# Patient Record
Sex: FEMALE | Race: WHITE | NOT HISPANIC OR LATINO | URBAN - METROPOLITAN AREA
[De-identification: names, ages, dates, MRNs, and addresses within clinical notes are randomized per-mention and may not be internally consistent; named-entity substitution may affect disease eponyms.]

---
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
## 2018-02-21 ENCOUNTER — RECORDS - HEALTHEAST (OUTPATIENT)
Dept: GENERAL RADIOLOGY | Facility: CLINIC | Age: 18
End: 2018-02-21

## 2018-02-21 ENCOUNTER — OFFICE VISIT - HEALTHEAST (OUTPATIENT)
Dept: PEDIATRICS | Facility: CLINIC | Age: 18
End: 2018-02-21

## 2018-02-21 DIAGNOSIS — M25.552 HIP PAIN, BILATERAL: ICD-10-CM

## 2018-02-21 DIAGNOSIS — Z11.3 SCREEN FOR STD (SEXUALLY TRANSMITTED DISEASE): ICD-10-CM

## 2018-02-21 DIAGNOSIS — M25.551 PAIN IN RIGHT HIP: ICD-10-CM

## 2018-02-21 DIAGNOSIS — M25.552 PAIN IN LEFT HIP: ICD-10-CM

## 2018-02-21 DIAGNOSIS — M25.551 HIP PAIN, BILATERAL: ICD-10-CM

## 2018-02-21 LAB
CLUE CELLS: NORMAL
HCG UR QL: NEGATIVE
HIV 1+2 AB+HIV1 P24 AG SERPL QL IA: NEGATIVE
SP GR UR STRIP: 1.01 (ref 1–1.03)
TRICHOMONAS, WET PREP: NORMAL
YEAST, WET PREP: NORMAL

## 2018-02-22 LAB
C TRACH DNA SPEC QL PROBE+SIG AMP: NEGATIVE
N GONORRHOEA DNA SPEC QL NAA+PROBE: NEGATIVE
SYPHILIS RPR SCREEN - HISTORICAL: NORMAL

## 2018-08-13 ENCOUNTER — COMMUNICATION - HEALTHEAST (OUTPATIENT)
Dept: PEDIATRICS | Facility: CLINIC | Age: 18
End: 2018-08-13

## 2018-08-16 ENCOUNTER — COMMUNICATION - HEALTHEAST (OUTPATIENT)
Dept: TELEHEALTH | Facility: CLINIC | Age: 18
End: 2018-08-16

## 2018-08-16 ENCOUNTER — OFFICE VISIT - HEALTHEAST (OUTPATIENT)
Dept: PEDIATRICS | Facility: CLINIC | Age: 18
End: 2018-08-16

## 2018-08-16 DIAGNOSIS — N94.6 DYSMENORRHEA: ICD-10-CM

## 2018-08-16 DIAGNOSIS — F43.23 ADJUSTMENT DISORDER WITH MIXED ANXIETY AND DEPRESSED MOOD: ICD-10-CM

## 2018-08-16 DIAGNOSIS — N80.9 ENDOMETRIOSIS: ICD-10-CM

## 2018-08-16 DIAGNOSIS — F17.200 SMOKING: ICD-10-CM

## 2018-08-16 DIAGNOSIS — F12.10 MARIJUANA ABUSE: ICD-10-CM

## 2018-08-16 DIAGNOSIS — Z01.818 PRE-OP EXAM: ICD-10-CM

## 2018-08-16 DIAGNOSIS — G47.9 DISORDERED SLEEP: ICD-10-CM

## 2018-08-16 LAB — HGB BLD-MCNC: 14.5 G/DL (ref 12–16)

## 2018-08-16 ASSESSMENT — MIFFLIN-ST. JEOR: SCORE: 1515.89

## 2018-08-17 ENCOUNTER — COMMUNICATION - HEALTHEAST (OUTPATIENT)
Dept: PEDIATRICS | Facility: CLINIC | Age: 18
End: 2018-08-17

## 2020-04-28 ENCOUNTER — RECORDS - HEALTHEAST (OUTPATIENT)
Dept: ADMINISTRATIVE | Facility: OTHER | Age: 20
End: 2020-04-28

## 2021-03-07 ENCOUNTER — RECORDS - HEALTHEAST (OUTPATIENT)
Dept: ADMINISTRATIVE | Facility: OTHER | Age: 21
End: 2021-03-07

## 2021-05-30 VITALS — HEIGHT: 66 IN | BODY MASS INDEX: 28.16 KG/M2 | WEIGHT: 175.2 LBS

## 2021-05-30 VITALS — BODY MASS INDEX: 28.28 KG/M2 | WEIGHT: 175.2 LBS

## 2021-05-30 VITALS — WEIGHT: 175.5 LBS | HEIGHT: 66 IN | BODY MASS INDEX: 28.21 KG/M2

## 2021-05-30 VITALS — BODY MASS INDEX: 27.64 KG/M2 | WEIGHT: 172 LBS | HEIGHT: 66 IN

## 2021-05-31 VITALS — BODY MASS INDEX: 28.73 KG/M2 | WEIGHT: 178 LBS

## 2021-05-31 VITALS — HEIGHT: 66 IN | WEIGHT: 174.3 LBS | BODY MASS INDEX: 28.01 KG/M2

## 2021-06-01 VITALS — WEIGHT: 161.2 LBS | BODY MASS INDEX: 25.3 KG/M2 | HEIGHT: 67 IN

## 2021-06-01 VITALS — BODY MASS INDEX: 28.23 KG/M2 | WEIGHT: 174.9 LBS
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
## 2021-06-09 NOTE — PROGRESS NOTES
Assessment/Plan:  1. Attention deficit disorder    - dextroamphetamine-amphetamine (ADDERALL XR) 30 MG 24 hr capsule; Take 1 capsule (30 mg total) by mouth every morning.  Dispense: 30 capsule; Refill: 0  - dextroamphetamine-amphetamine (ADDERALL XR) 30 MG 24 hr capsule; Take 1 capsule (30 mg total) by mouth every morning.  Dispense: 30 capsule; Refill: 0    2. Adjustment disorder with mixed anxiety and depressed mood    - venlafaxine (EFFEXOR XR) 75 MG 24 hr capsule; Take 1 capsule (75 mg total) by mouth daily.  Dispense: 30 capsule; Refill: 0    3. Adjustment disorder with mixed disturbance of emotions and conduct    - venlafaxine (EFFEXOR XR) 75 MG 24 hr capsule; Take 1 capsule (75 mg total) by mouth daily.  Dispense: 30 capsule; Refill: 0    4. Smoking  Quitting options offered and declined.     5. Disordered sleep      Patient Instructions   Sleep:    Set an earlier bed time. No napping. Use trazodone 100mg as needed. Add in in 5-10 mg of melatonin as needed.     I can set up referral to psychiatry any time.         ADHD:        Medication: Increase Adderall XR to 30mg once per day in the morning        Possible Side effects: Decreased appetite, difficulties sleeping, hyperactivity as the medicine wears off, headaches, unmasking a tic, depression or rarely psychosis.   Please call if you are experiencing any of these side effects.      Follow-Up: Follow up in 2 months (May 2017) or sooner with any concerns.      Lost prescriptions cannot be replaced.       Anxiety and Depression.     Medication: Effexor XR 75mg.      You have 37.5mg pills at home.  Take two of them until you don't have any more.  Then, you can  the 75mg pills.  Please take them consistently so that they can work.      Onset: Onset is usually within 2 week, but it may take up to 8 weeks to see a full effect.      Possible Side effects:The most common side effects can include sleepiness, nausea, headache, sweating, diarrhea, and an  increase in depression. These side effects can lessen with a longer time on the medicine.      This medication has a BLACK BOX WARNING because in some individuals it increases the risk of suicide. Keep alert to a possible increasing in depression. Check in with your child daily for the first 2-4 weeks to make sure things are going well.     Follow-Up:  Follow up in the clinic in 7-8 weeks, sooner if needed. Feel free to call in earlier if needed with any questions or concerns.          If for some reason you need to come off the medicine, you should not stop it cold turkey. To come off you should give us a call and then cut the dose in half for a week before stopping      We can set her up with a psychiatrist any time.   I will not prescribe remeron.   I still suggest a therapist.   I will consider a random drug screen at the next visit.  She is showing signs of increasing risky behaviors.     SUBJECTIVE:       Daily Emerson is a 16 y.o. female here to follow up on ADHD and depression and anxiety.      She was seen on 1/11/17 when we came off her zoloft and changed to Effexor XR gradual increase to 75mg per day.  She was increased on her Adderall XR 30mg. Pt is currently also on and Trazodone 100mg for sleep. At her visit on 5/12/16 she was changed from Concerta 36mg, and Prozac 40mg due to poor efficacy.      She has not been any improved.  Juvenile consistently take her Effexor.  She has not been on it barely at all over the last 2 months.  In the last week her mom notes that she was taking her medicine and made her start taking it.  She is very good at taking her Adderall but is very poor taking her Effexor.  She's not feeling very grade.  She is actually a little better than she was a few months ago but overall still not very happy.  She is feeling very anxious.  She started new job at [employer].  Her boss is been yelling at her great deal of this makes her anxious and upset.  Currently attention is okay.   She is less restless and fidgety.  Her focus is improved.  She thinks most of this is due to the increase in dose of Adderall.  She's had no side effects from the increase in Adderall.  She had no side effects from the Effexor either.   She is still struggling with sleep at night.  She is taking the trazodone 100 mg of feels like it doesn't work.  Her mother and brother on Remeron and she was wondering about that medication.  We tried to set her up for therapy at her last visit.  She never made this appointment and feels like she is not interested at this time.  She says that she is not enough time for it.  She has no suicide ideation.  No cutting or self harm.  She is having nightmares at night.  She had a panic attack yesterday.  She usually has one every couple weeks.  She has started smoking since her last visit.  She has 2 or 3 cigarettes per day.  She doesn't want her mom to know that she smoking.  Because her friends are smokers.  She still admits to marijuana abuse.  She says that she uses it 2 times per month.  She usually uses it by herself to calm her anxiety.     . She commonly goes to bed between 10 and midnight. She is napping after school. She naps for about 1-2 hours after school.  Her friendships have been better lately.  She feels like her friends are very dramatic and crazy. Her grades have been better. She's got A's and B's mostly. She has one C minus. She takes her adderall at around 7:30 in the morning. She feels like it lasts to about 3pm. She is sometimes a very quite person that bottles per emotion.     She denies any abuse of her Adderall. She does admit that her friends have asked her to abuse the Adderall and she has refused. She hides her medications so that they're not able to find it.She is in family therapy with her brother, but not for herself.Her mother is on sertraline as well as her grandmother and has done very well with this medication. Her mother has had a bad reaction to  Effexor. Her diagnosis was made in 6th grade.  She has tried Ritalin LA and she is not sure why that medicine was stopped.      Suicide Ideation/Cutting: no cutting. She can feel hopeless at times. No suicide plans.   Panic attacks, Gretta, post-traumatic stress: about one a week.   Current school and grade level: 12th grade fall 2016.  She wants to become a .    Special classes if any: None  Peer interactions: On and off.  She will pull back and hang with her mother instead of a friends  Drug abuse: Marijuana use recreationally. She uses it to unwind.     Social history:  Lives at home with parents and siblings  Family stressors: none    Family history:   ADHD: brother  Learning problems: none  Anxiety, Bipolar, depression: mother and brother.  Mom is on lexapro and buspar, and brother is on celexa.    Tic's or tourette's: none  Hypertrophic cardiomyopathy, long Qtc and sudden death: None       Data:     [place] score: Inattention #1-9: 3, hyperactivity #10-18: 3, ODD #19-26: 1, Conduct Disorder # 27-40: 0, Anxiety and Mood # 41-47: 0, Performance #48-55: 1             PHQ-9       Little interest or pleasure in doing things: Several days  Feeling down, depressed, or hopeless: Several days  Trouble falling or staying asleep, or sleeping too much: More than half the days  Feeling tired or having little energy: Several days  Poor appetite or overeating: Several days  Feeling bad about yourself - or that you are a failure or have let yourself or your family down: Not at all  Trouble concentrating on things, such as reading the newspaper or watching television: Several days  Moving or speaking so slowly that other people could have noticed. Or the opposite - being so fidgety or restless that you have been moving around a lot more than usual: Several days  Thoughts that you would be better off dead, or of hurting yourself in some way: Not at all  PHQ-9 Total Score: 8  If you checked off any problems, how difficult  have these problems made it for you to do your work, take care of things at home, or get along with other people?: Somewhat difficult    GUILLERMINA-7 Screening Results:  Feeling nervous, anxious or on edge: 2  Not being able to stop or control worry: 1  Worrying too much about different things: 1  Trouble relaxin  Being so restless that is is hard to sit still: 1  Becoming easily annnoyed or irritable: 2  Feeling afraid as if something awful might happen: 1  GUILLERMINA-7 Total: 10  How difficult did these problems make it for you to do your work, take care of things at home or get along with other people? : Somewhat difficult    Social History     Social History     Marital status: Single     Spouse name: N/A     Number of children: N/A     Years of education: N/A     Occupational History     Not on file.     Social History Main Topics     Smoking status: Current Every Day Smoker     Smokeless tobacco: Not on file     Alcohol use No     Drug use: No     Sexual activity: No     Other Topics Concern     Not on file     Social History Narrative        Past Medical History:  No past medical history on file.  No past surgical history on file.    Current Meds:   Current Outpatient Prescriptions on File Prior to Visit   Medication Sig Dispense Refill     cetirizine (ZYRTEC) 10 MG tablet Take 10 mg by mouth.       etonogestrel (NEXPLANON) 68 mg Impl implant Inject 1 each under the skin.       venlafaxine (EFFEXOR-XR) 37.5 MG 24 hr capsule Use 37.5 mg daily for 1 week. Increase to 2 pills per day after that. 40 capsule 1     [DISCONTINUED] dextroamphetamine-amphetamine (ADDERALL XR) 30 MG 24 hr capsule Take 1 capsule (30 mg total) by mouth every morning. 30 capsule 0     [DISCONTINUED] dextroamphetamine-amphetamine (ADDERALL XR) 30 MG 24 hr capsule Take 1 capsule (30 mg total) by mouth every morning. 30 capsule 0     No current facility-administered medications on file prior to visit.      Allergies: No Known  "Allergies    ROS:  General: Health is good  Endocrine: Growing in height and weight well.  Cardiac: No chest pain, palpitations, or syncope, No chest pain with exercise   GI: Good appetite, no stomachaches, no nausea, no diarrhea, no emesis, no constipation  : no enuresis  Neuro: No headaches, sleep disturbance, tics, changes in mood, no changes in activity level.     Exam:  Vitals:    03/15/17 1653   BP: 120/74   Patient Site: Right Arm   Patient Position: Sitting   Cuff Size: Adult Regular   Pulse: 92   Weight: 175 lb 8 oz (79.6 kg)   Height: 5' 6\" (1.676 m)       MENTAL STATUS:  Gen: Alert, oriented. Well groomed, interacts appropriately with examiner.    Speech:No abnormal speech or flight of ideas.   Mood: euthymic.    Thought content: No abnormal thought content.  Judgment: intact  Fund of knowledge: appropriate for age.  Neck: thyroid non enlarged  Cardiovascular Exam: RRR without murmurs, clicks or gallops.  Lung Exam: Clear and equal breath sounds.  Musculoskeletal Exam: Gross survey unremarkable. Gait smooth and coordinated.       Total of 30 minutes spent with patient, > 50% spent in counseling and/or coordination of care.     Shaylee Weinberg ....................  3/15/2017   4:59 PM      "

## 2021-06-09 NOTE — PROGRESS NOTES
Assessment/Plan:  1. Attention deficit disorder    - Ambulatory referral to Psychiatry    2. Adjustment disorder with mixed anxiety and depressed mood    - Ambulatory referral to Psychiatry  - venlafaxine (EFFEXOR XR) 150 MG 24 hr capsule; Take 1 capsule (150 mg total) by mouth daily.  Dispense: 60 capsule; Refill: 0    3. Adjustment disorder with mixed disturbance of emotions and conduct    - Ambulatory referral to Psychiatry  - venlafaxine (EFFEXOR XR) 150 MG 24 hr capsule; Take 1 capsule (150 mg total) by mouth daily.  Dispense: 60 capsule; Refill: 0    4. Disordered sleep    - Ambulatory referral to Psychiatry  - traZODone (DESYREL) 50 MG tablet; Take 2 tablets (100 mg total) by mouth at bedtime.  Dispense: 60 tablet; Refill: 1    5. Marijuana abuse    - Ambulatory referral to Psychiatry    Help quitting smoking was offered and declined.   She can see an adult psychiatrist now that she is 16 years of age.    They will take over he medication management.    I can fill prescriptions to get her through until she is seen, but will no longer be prescribing for her mental health needs.   She has an Adderall XR 30mg rx previously sent that should cover her until 5/15/17.      Patient Instructions   I will refer you to psychiatry today due to the inability to get your anxiety, depression and sleep under control.     Our referral desk should contact you within 3-4 days to help set up this appointment. If you would like, you can make the appointment on your own before that time or before you leave today.     Please call and contact your insurance and ask them about coverage for the following providers.    Continue with therapy.       Sleep:     Set an earlier bed time. No napping. Use trazodone 100mg as needed. Add in in 5-10 mg of melatonin as needed.              ADHD:          Medication: Continue Adderall XR to 30mg once per day in the morning          Possible Side effects: Decreased appetite, difficulties sleeping,  hyperactivity as the medicine wears off, headaches, unmasking a tic, depression or rarely psychosis.   Please call if you are experiencing any of these side effects.         Lost prescriptions cannot be replaced.         Anxiety and Depression.      Medication: Increase Effexor  mg.      Possible Side effects:The most common side effects can include sleepiness, nausea, headache, sweating, diarrhea, and an increase in depression. These side effects can lessen with a longer time on the medicine.       This medication has a BLACK BOX WARNING because in some individuals it increases the risk of suicide. Keep alert to a possible increasing in depression. Check in with your child daily for the first 2-4 weeks to make sure things are going well.        If for some reason you need to come off the medicine, you should not stop it cold turkey. To come off you should give us a call and then cut the dose in half for a week before stopping      SUBJECTIVE:  Daily Emerson is a 16 y.o. female here with her mother to follow up on ADHD and depression and anxiety.       She was last seen on 3/15/17.  There was no changes to her medication made.  I asked her to consistently take her Effexor and follow up in 2 months.  She is also on Adderall XR 30mg for ADD and Trazodone 100mg for sleep.  On 1/11/17 when we came off her zoloft and changed to Effexor XR 75mg and she was increased on her Adderall XR 30mg. At her visit on 5/12/16 she was changed from Concerta 36mg, and Prozac 40mg due to poor efficacy.      Daily has been doing well.  She is seem more more depressed over the last month.  She does not seem any particular improvement with the Effexor so far.  She says that some days she feels good but in general she overall she is feeling worse.  She says she is much more sad and depressed.  Before this time she was never crying individual and now she cries nearly daily.  She seems to get extremely angry very quickly.  She is  irritated by her family commonly.  She does not want to hang out with them anymore.  She only wants to hang out with her friends.  They seem to be sort of a bad influence overall.  She is now smoking when she was in before.  She is smoking marijuana.  She has been missing a ton of school.  She would not go to school without her mom being aware.  She had so many absences that she had to come in and speak with the school counselor.  She is seen the therapist at school now once per week for the last 3 weeks.  They have set her up with a suggestion for a therapist more long-term.  She says that she would rather sleep all day then go to school.  She has mostly A's and B's with one D.  She has no homework on a regular basis so that has not been an issue.  She does not say that she feels like killing herself.  She had some problems with a boy over the last month which is part of her worsening depression.  There are no longer together mom thinks that this is a good thing.  She stopped working at [employer].  It was never really positive place for her.  She is not isolating herself.  No change in appetite.  Her sleep is all over the place per mom.  Daily thinks it is okay.  She naps nearly daily.  She stays up very late at night.  She has to go out with friends at 1 AM on a school night.  She uses trazodone as needed 100 mg.  Uses this most of the time.  He denies any self-harm.  She says she has been taking the Effexor consistently for the last month.  He does like her attention is good and she is not worried about her ADD.  Mom is wondering about Wellbutrin.  Mom is on Wellbutrin plus her antidepression medicine and she said that that helped her quit smoking significantly.  At this time Daily has no desire to quit smoking.She still admits to marijuana abuse. She says that she uses it 2 times per month. She usually uses it by herself to calm her anxiety.        She denies any abuse of her Adderall. She does admit  that her friends have asked her to abuse the Adderall and she has refused. She hides her medications so that they're not able to find it.  She is in family therapy with her brother, but not for herself. Her mother is on sertraline as well as her grandmother and has done very well with this medication. Her mother has had a bad reaction to Effexor. Her diagnosis was made in 6th grade.  She has tried Ritalin LA and she is not sure why that medicine was stopped.      Suicide Ideation/Cutting: no cutting. She can feel hopeless at times. No suicide plans.   Panic attacks, Gretta, post-traumatic stress: about one a week.   Current school and grade level: 12th grade fall 2016.  She wants to become a .    Special classes if any: None  Peer interactions: On and off.  She will pull back and hang with her mother instead of a friends  Drug abuse: Marijuana use recreationally. She uses it to unwind.   Social history:  Lives at home with parents and siblings  Family stressors: boy problems.    Family history:   ADHD: brother  Learning problems: none  Anxiety, Bipolar, depression: mother and brother.  Mom is on lexapro and buspar, and brother is on celexa.    Tic's or tourette's: none  Hypertrophic cardiomyopathy, long Qtc and sudden death: None           Data:    Kathy score parents: Inattention #1-9: 4, hyperactivity #10-18: 0, Performance #19-26: 4    Comparison to last visit:   Prior Kathy score: Inattention #1-9: 3, hyperactivity #10-18: 3, ODD #19-26: 1, Conduct Disorder # 27-40: 0, Anxiety and Mood # 41-47: 0, Performance #48-55: 1           PHQ-9     Little interest or pleasure in doing things: Several days  Feeling down, depressed, or hopeless: Several days  Trouble falling or staying asleep, or sleeping too much: More than half the days  Feeling tired or having little energy: Several days  Poor appetite or overeating: Several days  Feeling bad about yourself - or that you are a failure or have let yourself or  your family down: Not at all  Trouble concentrating on things, such as reading the newspaper or watching television: Several days  Moving or speaking so slowly that other people could have noticed. Or the opposite - being so fidgety or restless that you have been moving around a lot more than usual: Several days  Thoughts that you would be better off dead, or of hurting yourself in some way: Not at all  PHQ-9 Total Score: 8  If you checked off any problems, how difficult have these problems made it for you to do your work, take care of things at home, or get along with other people?: Somewhat difficult     GUILLERMINA-7 Screening Results:  Feeling nervous, anxious or on edge: 2  Not being able to stop or control worry: 1  Worrying too much about different things: 1  Trouble relaxin  Being so restless that is is hard to sit still: 1  Becoming easily annnoyed or irritable: 2  Feeling afraid as if something awful might happen: 1  GUILLERMINA-7 Total: 10  How difficult did these problems make it for you to do your work, take care of things at home or get along with other people? : Somewhat difficult        Social History     Social History     Marital status: Single     Spouse name: N/A     Number of children: N/A     Years of education: N/A     Occupational History     Not on file.     Social History Main Topics     Smoking status: Current Every Day Smoker     Smokeless tobacco: Not on file     Alcohol use No     Drug use: No     Sexual activity: No     Other Topics Concern     Not on file     Social History Narrative       Past Medical History:  No past medical history on file.  No past surgical history on file.    Current Meds:   Current Outpatient Prescriptions on File Prior to Visit   Medication Sig Dispense Refill     cetirizine (ZYRTEC) 10 MG tablet Take 10 mg by mouth.       dextroamphetamine-amphetamine (ADDERALL XR) 30 MG 24 hr capsule Take 1 capsule (30 mg total) by mouth every morning. 30 capsule 0     [START ON  "4/15/2017] dextroamphetamine-amphetamine (ADDERALL XR) 30 MG 24 hr capsule Take 1 capsule (30 mg total) by mouth every morning. 30 capsule 0     etonogestrel (NEXPLANON) 68 mg Impl implant Inject 1 each under the skin.       [DISCONTINUED] venlafaxine (EFFEXOR XR) 75 MG 24 hr capsule Take 1 capsule (75 mg total) by mouth daily. 30 capsule 0     [DISCONTINUED] venlafaxine (EFFEXOR-XR) 37.5 MG 24 hr capsule Use 37.5 mg daily for 1 week. Increase to 2 pills per day after that. 40 capsule 1     No current facility-administered medications on file prior to visit.      Allergies: No Known Allergies    ROS:  General: Health is good  Endocrine: Growing in height and weight well.  Cardiac: No chest pain, palpitations, or syncope, No chest pain with exercise   GI: Good appetite, no stomachaches, no nausea, no diarrhea, no emesis, no constipation  : no enuresis  Neuro: No headaches, sleep disturbance, tics, changes in mood, no changes in activity level.     Exam:  Vitals:    04/05/17 1346   BP: 118/74   Patient Site: Right Arm   Patient Position: Sitting   Cuff Size: Adult Large   Pulse: 100   Resp: 16   Weight: 175 lb 3.2 oz (79.5 kg)   Height: 5' 6\" (1.676 m)       MENTAL STATUS:  Gen: Alert, oriented. Well groomed, interacts appropriately with examiner.    Speech:No abnormal speech or flight of ideas.   Mood: euthymic.    Thought content: No abnormal thought content.  Judgment: intact  Fund of knowledge: appropriate for age.  Neck: thyroid non enlarged  Cardiovascular Exam: RRR without murmurs, clicks or gallops.  Lung Exam: Clear and equal breath sounds.  Musculoskeletal Exam: Gross survey unremarkable. Gait smooth and coordinated.         Total of 30 minutes spent with patient, > 50% spent in counseling and/or coordination of care.     Shaylee Weinberg ....................  4/5/2017   1:47 PM    "

## 2021-06-10 NOTE — PROGRESS NOTES
ASSESSMENT:   1. Throat pain  Rapid Strep A Screen-Throat    Group A Strep, RNA Direct Detection, Throat   2. URI (upper respiratory infection)         PLAN:  Upper respiratory infection  Likely viral at this time.   Push fluids, get extra rest  Recommend hot tea with lemon/honey, lozenges, chloraseptic spray or salt water gargles to soothe your throat  Recommend hot steamy showers, saline nasal spray or a netti pot to relieve congestion  May use a cough suppressant or a cool mist humidifier to lessen cough  Return to clinic if symptoms are not improving as expected or if worsening in any way.       SUBJECTIVE:   aDily Emerson is a 16 y.o. female presents today with cold symptoms for 2 days. She has had sore throat, rhinorrhea, nasal congestion, PND, mostly dry cough but denies swollen glands, myalgias, headache, fever, chills and SOB, dyspnea. Sick contacts: family with URI. Has tried cough syrup and ibuprofen with some relief. She smokes tobacco once daily. She denies any hx of asthma.     No past medical history on file.    History   Smoking Status     Current Every Day Smoker   Smokeless Tobacco     Not on file       Current Medications:  Current Outpatient Prescriptions   Medication Sig Dispense Refill     dextroamphetamine-amphetamine (ADDERALL XR) 30 MG 24 hr capsule Take 1 capsule (30 mg total) by mouth every morning. 30 capsule 0     etonogestrel (NEXPLANON) 68 mg Impl implant Inject 1 each under the skin.       traZODone (DESYREL) 50 MG tablet Take 2 tablets (100 mg total) by mouth at bedtime. 60 tablet 1     venlafaxine (EFFEXOR XR) 150 MG 24 hr capsule Take 1 capsule (150 mg total) by mouth daily. 60 capsule 0     cetirizine (ZYRTEC) 10 MG tablet Take 10 mg by mouth.       No current facility-administered medications for this visit.        Allergies:   No Known Allergies    OBJECTIVE:   Vitals:    05/02/17 1830   BP: 96/62   Pulse: 99   Resp: 16   Temp: 99.4  F (37.4  C)   TempSrc: Temporal   SpO2: 99%    Weight: 175 lb 3.2 oz (79.5 kg)     Physical exam reveals a pleasant 16 y.o. female.   Appears alert and cooperative.  Eyes:  ELIS, EOMI  Ears:  normal TMs bilaterally and normal canals bilaterally  Nose:    Mucosa normal. Scant, clear rhinorrhea.septum midline, normal mucosa and clear rhinorrhea  Mouth:  Mucosa pink and moist.  mild erythema   Neck: few small anterior cervical nodes  Sinuses: nontender with palpation  Lungs: Chest is clear, no wheezing or rales. Symmetric air entry throughout both lung fields. Occasional congested cough  Heart: regular rate and rhythm, no murmur, rub or gallop

## 2021-06-11 NOTE — PROGRESS NOTES
Assessment/Plan:  1. Attention deficit disorder    - dextroamphetamine-amphetamine (ADDERALL XR) 30 MG 24 hr capsule; Take 1 capsule (30 mg total) by mouth every morning.  Dispense: 30 capsule; Refill: 0  - dextroamphetamine-amphetamine (ADDERALL XR) 30 MG 24 hr capsule; Take 1 capsule (30 mg total) by mouth every morning.  Dispense: 30 capsule; Refill: 0  - dextroamphetamine-amphetamine (ADDERALL XR) 30 MG 24 hr capsule; Take 1 capsule (30 mg total) by mouth every morning.  Dispense: 30 capsule; Refill: 0    2. Adjustment disorder with mixed anxiety and depressed mood    - venlafaxine (EFFEXOR XR) 150 MG 24 hr capsule; Take 1 capsule (150 mg total) by mouth daily.  Dispense: 60 capsule; Refill: 2    3. Adjustment disorder with mixed disturbance of emotions and conduct    - venlafaxine (EFFEXOR XR) 150 MG 24 hr capsule; Take 1 capsule (150 mg total) by mouth daily.  Dispense: 60 capsule; Refill: 2    Patient Instructions   I am happy to continue to prescribe her medications as long as remained stable.  We will again begin to struggle with anxiety and depression I would suggest that he follow-up with psychiatry like we spoke about at the last visit.    Medication: Effexor XR 150mg daily. Adderall XR 30mg daily. Trazodone 100mg at night as needed for sleep.  OK to wean off of this as tolerated.     This medication has a BLACK BOX WARNING because in some individuals it increases the risk of suicide.  Keep alert to a possible increasing in depression.  Check in with your child daily for the first 2-4 weeks to make sure things are going well.    Follow-Up: Follow up in the clinic in 6 months (December 2017), sooner if needed.     Call in 3 months (September) for 3 more Adderall prescriptions.      If for some reason you need to come off the medicine, you should not stop it cold turkey.  To come off you should give us a call and then cut the dose in half for a week before stopping.       Psychology is suggested and  declined.     Continue with efforts to try to stop smoking and marijuana use.  Daily is aware that she could have a drug test with me at any time.          SUBJECTIVE:  Daily Emerson is a 16 y.o. female for anxiety, depression, and ADHD inattentive type.         She was last seen on 4/5/17 when her Effexor XR was increased to 150mg, and she was referred to psychiatry for increasing anxiety and depression despite various treatment changes.      She is also on Adderall XR 30mg for ADD, and Trazodone 100mg for sleep.  On 1/11/17 when we came off her zoloft and changed to Effexor XR 75mg and she was increased on her Adderall XR 30mg. At her visit on 5/12/16 she was changed from Concerta 36mg, and Prozac 40mg due to poor efficacy.        Daily has been doing very well.  The increase in Effexor seemed to make a big difference and she is doing much better.  She is also graduated from high school and she is so happy about that.  She has been waiting to graduate since ninth grade.  She feels like the school setting is not a very good place for her.  She is excited to go out to the work field.  Currently she is looking for jobs.  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  She thinks she is can have to get 2 jobs in order to support her finances.  She does not plan to go to college at this point but may consider it in the future.  She has less anxiety and less depression right now.  She is not sad or tearful.  She only has a panic attack once every 6 months or so.  She has not had one in a long time.  She is fighting less with her family.  She is social with her friends.  She is smoking less and trying to quit.  She now smokes only about 2 days per week.  She still is smoking marijuana.  She has marijuana for about 5 days in a row once per month and then stops.  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  She denies abusing her Adderall.  She is very forthcoming  with what she does not does not do.  Looking at the controlled substance [place] prescribing program online she is only filled the prescriptions I have ever given her and has no other controlled substances.  She takes the medication every day.  Even though she is out of school she believes that she is going to need the medicine.  She really struggles with focus and concentration and organization outside of school as well.  She has been sleeping better.  She has not needed her trazodone.  She has been using it only about once per week as needed.  She is also not using melatonin.  She is glad about that progress.  She hopes that she can come off of it altogether.      Mom is on Wellbutrin plus her antidepression medicine and she said that that helped her quit smoking significantly.  Her mother is on sertraline as well as her grandmother and has done very well with this medication. Her mother has had a bad reaction to Effexor. Her diagnosis was made in 6th grade.  She has tried Ritalin LA and she is not sure why that medicine was stopped.      Suicide Ideation/Cutting: no cutting. No suicide plans.   Current school and grade level: 12th grade fall 2016.  She wants to become a .    Special classes if any: None  Peer interactions: On and off.  She will pull back and hang with her mother instead of a friends  Drug abuse: Marijuana use recreationally. She uses it to unwind.   Social history:  Lives at home with parents and siblings    Family history:   ADHD: brother  Learning problems: none  Anxiety, Bipolar, depression: mother and brother.  Mom is on lexapro and buspar, and brother is on celexa.    Tic's or tourette's: none  Hypertrophic cardiomyopathy, long Qtc and sudden death: None         Data:     Prior [place] score parents: Inattention #1-9: 4, hyperactivity #10-18: 0, Performance #19-26: 4        PHQ-9     PHQ-9 Total Score: 5  If you checked off any problems, how difficult have these problems made it for  you to do your work, take care of things at home, or get along with other people?: Somewhat difficult     GUILLERMINA-7 Screening Results:    GUILLERMINA-7 Total: 7  How difficult did these problems make it for you to do your work, take care of things at home or get along with other people? : Somewhat difficult          Social History     Social History     Marital status: Single     Spouse name: N/A     Number of children: N/A     Years of education: N/A     Occupational History     Not on file.     Social History Main Topics     Smoking status: Current Every Day Smoker     Smokeless tobacco: Not on file     Alcohol use No     Drug use: No     Sexual activity: No     Other Topics Concern     Not on file     Social History Narrative       Past Medical History:  No past medical history on file.  No past surgical history on file.    Current Meds:   Current Outpatient Prescriptions on File Prior to Visit   Medication Sig Dispense Refill     cetirizine (ZYRTEC) 10 MG tablet Take 10 mg by mouth.       etonogestrel (NEXPLANON) 68 mg Impl implant Inject 1 each under the skin.       traZODone (DESYREL) 50 MG tablet Take 2 tablets (100 mg total) by mouth at bedtime. 60 tablet 1     [DISCONTINUED] dextroamphetamine-amphetamine (ADDERALL XR) 30 MG 24 hr capsule Take 1 capsule (30 mg total) by mouth every morning. 30 capsule 0     [DISCONTINUED] venlafaxine (EFFEXOR XR) 150 MG 24 hr capsule Take 1 capsule (150 mg total) by mouth daily. 60 capsule 0     No current facility-administered medications on file prior to visit.      Allergies: No Known Allergies    ROS:  General: Health is good  Endocrine: Growing in height and weight well.  Cardiac: No chest pain, palpitations, or syncope, No chest pain with exercise   GI: Good appetite, no stomachaches, no nausea, no diarrhea, no emesis, no constipation  : no enuresis  Neuro: No headaches, sleep disturbance, tics, changes in mood, no changes in activity level.     Exam:  Vitals:    06/01/17 1351  "  BP: 110/68   Patient Site: Right Arm   Patient Position: Sitting   Cuff Size: Adult Regular   Pulse: 84   Weight: 174 lb 4.8 oz (79.1 kg)   Height: 5' 6\" (1.676 m)       MENTAL STATUS:  Gen: Alert, oriented. Well groomed, interacts appropriately with examiner.    Speech:No abnormal speech or flight of ideas.   Mood: euthymic.    Thought content: No abnormal thought content.  Judgment: intact  Fund of knowledge: appropriate for age.  Neck: thyroid non enlarged  Cardiovascular Exam: RRR without murmurs, clicks or gallops.  Lung Exam: Clear and equal breath sounds.  Musculoskeletal Exam: Gross survey unremarkable. Gait smooth and coordinated.       Total of 30 minutes spent with patient, > 50% spent in counseling and/or coordination of care.     Shaylee Weinberg ....................  6/1/2017   1:52 PM      "

## 2021-06-15 PROBLEM — F12.10 MARIJUANA ABUSE: Status: ACTIVE | Noted: 2017-04-05

## 2021-06-15 PROBLEM — G47.9 DISORDERED SLEEP: Status: ACTIVE | Noted: 2017-03-15

## 2021-06-15 PROBLEM — F17.200 SMOKING: Status: ACTIVE | Noted: 2017-04-05
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
## 2021-06-19 NOTE — PROGRESS NOTES
Preoperative Exam    Scheduled Procedure: Endometriosis Surgery 08/21/2018  Surgery Date:  08/21/2018  Surgery Location: [facility]  Surgeon:  Toney    Assessment/Plan:     1. Pre-op exam    - Hemoglobin    2. Endometriosis    - Hemoglobin    3. Dysmenorrhea      4. Marijuana abuse      5. Smoking      6. Disordered sleep      7. Adjustment disorder with mixed anxiety and depressed mood          Surgical Procedure Risk: Low (reported cardiac risk generally < 1%)  Have you had prior anesthesia?: Yes  Have you or any family members had a previous anesthesia reaction: Yes: itchy after  Do you or any family members have a history of a clotting or bleeding disorder?:  Yes: Endometriosis    Patient approved for surgery with general or local anesthesia.        Functional Status: Age Appropriate [place]  Patient plans to recover at home with family.  Do you have any concerns regarding care after surgery?: No     Subjective:      Daily Emerson is a 17 y.o. female who presents for a preoperative consultation.       She has a left 3cm ovarian cyst. Daily had a diagnostic laparoscopy in 2015. This showed some early endometriosis, which was treated. Patient had Nexplanon placed and this seemed to help for quite a while.    Patient now with pelvic pain, bloating, hip pain. Nexplanon had had some breakthrough bleeding. Patient would like to proceed with second diagnostic laparoscopy due to relief that she got with last surgery and the strong family history and now similar symptoms.     She has had some itching after anesthesia in the past.     She is on trazodone 1-2 times per month for disordered sleep.  She has a history of anxiety and ADHD that is not currently treated. She admits to frequent marijuana use and cigarette smoking.    All other systems reviewed and are negative, other than those listed in the HPI.    Pertinent History  Any croup, wheezing or respiratory illness in the past 3 weeks?:  Yes: just  finishing cold sx  History of obstructive sleep apnea: Yes, Trazadone to assist with sleep  Steroid use in the last 6 months: No  Any ibuprofen, NSAID or aspirin use in the last 2 weeks?: No made pt aware to avoid  Prior Blood Transfusion: No  Prior Blood Transfusion Reaction: No  If for some reason prior to, during or after the procedure, if it is medically indicated, would you be willing to have a blood transfusion?:  There is no transfusion refusal.  Any exposure in the past 3 weeks to chicken pox, Fifth disease, whooping cough, measles, tuberculosis?: No    Current Outpatient Prescriptions   Medication Sig Dispense Refill     cetirizine (ZYRTEC) 10 MG tablet Take 10 mg by mouth.       etonogestrel (NEXPLANON) 68 mg Impl implant Inject 1 each under the skin.       traZODone (DESYREL) 50 MG tablet Take 2 tablets (100 mg total) by mouth at bedtime. 60 tablet 1     venlafaxine (EFFEXOR XR) 150 MG 24 hr capsule Take 1 capsule (150 mg total) by mouth daily. 60 capsule 2     etonogestrel (NEXPLANON) 68 mg Impl implant Inject 1 each under the skin.       No current facility-administered medications for this visit.         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    No past surgical history on file.    Social History     Social History     Marital status: Single     Spouse name: N/A     Number of children: N/A     Years of education: N/A     Occupational History     Not on file.     Social History Main Topics     Smoking status: Current Every Day Smoker     Smokeless tobacco: Never Used     Alcohol use No     Drug use: No     Sexual activity: No     Other Topics Concern     Not on file     Social History Narrative  "            Objective:     Vitals:    08/16/18 1425   BP: 110/72   Pulse: 90   Temp: 98.8  F (37.1  C)   TempSrc: Oral   SpO2: 97%   Weight: 161 lb 3.2 oz (73.1 kg)   Height: 5' 6.5\" (1.689 m)   LMP: 08/09/2018         Physical Exam:  General appearance: Alert, well nourished, in no distress.  Head: normocephalic, atraumatic  Eye Exam: PERRL, EOMI, fundi grossly normal, no erythema or discharge.  Ear Exam: Canals and TMs clear bilaterally.  Nose Exam: normal mucosa, no discharge or polyps.  Oropharynx Exam: no erythema, edema, or exudates.   Neck Exam: neck is soft with a full range of motion. No thyromegaly  Lymph: No lymphadenopathy appreciated in anterior or posterior cervical chain or supraclavicular region.    Cardiovascular Exam: RRR without murmurs rubs or gallops. Normal S1 and S2  Lung Exam: Clear to auscultation, no wheezing, rhonchi or rales.  No increased work of breathing. Aaron stage 5  Abdomen Exam: Soft, non tender, non distended.  Bowel sounds present.  No masses or hepatosplenomegaly  Genital Exam: normal external female genitalia and Aaron stage 5  Skin Exam: Skin color, texture, turgor appropriate. No rashes or lesions.  Musculoskeletal Exam: Gross survey unremarkable. Gait smooth and coordinated. Back is straight  Neuro: Appropriate affect and stature, normocephalic and atraumatic, No meningismus, facial symmetry with facial movements and at rest, PERRL, EOMFI, palate symmetrical, uvula midline, DTR's +2 bilateral in upper extremities and lower extremities, no clonus, muscle strength +4 bilaterally in upper and lower extremities, normal muscle bulk for age, finger nose finger intact, no diadochokinesis, able to walk heel-toe with ease, able to walk on toes and heels without difficulty      Patient Instructions   She is cleared for surgery.     A copy of the pre-op was given to the family and faxed to the appropriate facility today.     Surgery would be cancelled if there is a fever, or " increased work of breathing within 24 hours of the surgery.     Contact your surgeon if either one of these occur so that you can reschedule your surgery.        We talked about that her anxiety seems stable off Effexor, but I worry she is treating her anxiety with marijuana.  We talked about trying to stop the marijuana and restart her Effexor in the future as desired.     She wanted to restart her Adderall XR today.  Since she is out of school and 18yo we talked about reestablishing with a psychiatrist or FP that would treat adult ADHD.  I do not feel comfortable doing that and so no medications were refilled today.     I can continue to prescribe her trazodone 50mg prn.      Labs:  Recent Results (from the past 24 hour(s))   Hemoglobin    Collection Time: 08/16/18  3:17 PM   Result Value Ref Range    Hemoglobin 14.5 12.0 - 16.0 g/dL       Immunization History   Administered Date(s) Administered     DTaP, historic 1998, 01/07/1999, 02/22/1999, 11/22/1999     HPV Quadrivalent 07/11/2011, 01/17/2012, 10/11/2012     Hep A, historic 05/12/2014, 10/01/2014     Hep B, historic 1998, 1998, 1998, 02/22/1999     HiB, historic,unspecified 1998, 01/07/1999, 02/22/1999, 11/22/1999     IPV 1998, 01/07/1999, 09/01/1999, 08/09/2004     Influenza, inj, historic,unspecified 10/12/2009, 10/11/2012, 10/01/2014     Influenza, nasal, historic 12/16/2015     MMR 09/01/1999, 08/09/2004     Meningococcal MCV4 Conjugate,Unspecified 07/11/2011, 05/12/2016     Pneumo Conj 7-V(before 2010) 10/06/2000, 09/19/2001     Rotavirus, historic 1998, 1998, 02/22/1999     Tdap 07/11/2011     Varicella 09/01/1999, 07/11/2011         Electronically signed by Shaylee Weinberg DO 08/16/18 2:25 PM

## 2021-06-25 NOTE — PROGRESS NOTES
Progress Notes by Clemente Coronado DO at 9/20/2017  7:20 PM     Author: Clemente Coronado DO Service: -- Author Type: Physician    Filed: 9/21/2017 10:19 AM Encounter Date: 9/20/2017 Status: Signed    : Clemente Coronado DO (Physician)       Chief Complaint   Patient presents with   ? Sore Throat     x 1 day.  left ear pain.    ? Contraception     check nexplanon in left arm. Its moving     History of Present Illness: Nursing notes reviewed. Patient felt her nexplanon was in a different position from the insertion point, migrating about 1.5 cm proximally toward left shoulder in her upper arm. She has also had a sore throat since earlier today. No recent fever or chills.    Review of systems: See history of present illness, otherwise negative.     Current Outpatient Prescriptions   Medication Sig Dispense Refill   ? cetirizine (ZYRTEC) 10 MG tablet Take 10 mg by mouth.     ? dextroamphetamine-amphetamine (ADDERALL XR) 30 MG 24 hr capsule Take 1 capsule (30 mg total) by mouth every morning. 30 capsule 0   ? dextroamphetamine-amphetamine (ADDERALL XR) 30 MG 24 hr capsule Take 1 capsule (30 mg total) by mouth every morning. 30 capsule 0   ? dextroamphetamine-amphetamine (ADDERALL XR) 30 MG 24 hr capsule Take 1 capsule (30 mg total) by mouth every morning. 30 capsule 0   ? etonogestrel (NEXPLANON) 68 mg Impl implant Inject 1 each under the skin.     ? traZODone (DESYREL) 50 MG tablet Take 2 tablets (100 mg total) by mouth at bedtime. 60 tablet 1   ? venlafaxine (EFFEXOR XR) 150 MG 24 hr capsule Take 1 capsule (150 mg total) by mouth daily. 60 capsule 2     No current facility-administered medications for this visit.        No past medical history on file.   No past surgical history on file.   Social History     Social History   ? Marital status: Single     Spouse name: N/A   ? Number of children: N/A   ? Years of education: N/A     Social History Main Topics   ? Smoking status: Current Every Day Smoker   ? Smokeless  tobacco: None   ? Alcohol use No   ? Drug use: No   ? Sexual activity: No     Other Topics Concern   ? None     Social History Narrative       History   Smoking Status   ? Current Every Day Smoker   Smokeless Tobacco   ? Not on file      Exam:   Blood pressure 102/60, pulse (!) 105, temperature 98.1  F (36.7  C), temperature source Oral, resp. rate 18, weight 178 lb (80.7 kg), SpO2 99 %, not currently breastfeeding.    EXAM:   Wt Readings from Last 3 Encounters:   09/20/17 178 lb (80.7 kg) (94 %, Z= 1.59)*   06/01/17 174 lb 4.8 oz (79.1 kg) (94 %, Z= 1.53)*   05/02/17 175 lb 3.2 oz (79.5 kg) (94 %, Z= 1.56)*     * Growth percentiles are based on [facility] 2-20 Years data.     Temp Readings from Last 3 Encounters:   09/20/17 98.1  F (36.7  C) (Oral)   05/02/17 99.4  F (37.4  C) (Temporal)     BP Readings from Last 3 Encounters:   09/20/17 102/60   06/01/17 110/68   05/02/17 96/62     Pulse Readings from Last 3 Encounters:   09/20/17 (!) 105   06/01/17 84   05/02/17 99       General: Vital signs reviewed. Patient is in no acute appearing distress. Breathing is non labored appearing. Patient is alert and oriented x 3.   ENT: Ear exam shows clear TMs with no injection, nasal turbinates are slightly injected and edematous with increased clear rhinorrhea, no pharyngeal injection with increased post nasal drainage noted.  Neck: supple with no adenopathy.  Heart: Normal rate and rhythm without murmur  Lungs: Clear to auscultation with good air flow bilaterally.  Skin: warm and dry  Recent Results (from the past 24 hour(s))   Rapid Strep A Screen-Throat   Result Value Ref Range    Rapid Strep A Antigen No Group A Strep detected, presumptive negative No Group A Strep detected, presumptive negative    Results from exam reviewed with patient.    Assessment/Plan   1. Throat pain  Rapid Strep A Screen-Throat    Group A Strep, RNA Direct Detection, Throat   2. Viral pharyngitis         Patient Instructions     We will notify you if the  pending strep study is positive, and send a prescription to your pharmacy for treatment if it is positive. Otherwise, you can assume the test was negative if not contacted over the next 48 hours. Also see info below. I think you likely have a viral throat infection.    Self-Care for Sore Throats  Sore throats happen for many reasons, such as colds, allergies, and infections caused by viruses or bacteria. In any case, your throat becomes red and sore. Your goal for self-care is to reduce your discomfort while giving your throat a chance to heal.    Moisten and soothe your throat  Tips include the following:    Try a sip of water first thing after waking up.    Keep your throat moist by drinking 6 or more glasses of clear liquids every day.    Run a cool-air humidifier in your room overnight.    Avoid cigarette smoke.     Suck on throat lozenges, cough drops, hard candy, ice chips, or frozen fruit-juice bars. Use the sugar-free versions if your diet or medical condition requires them.  Gargle to ease irritation  Gargling every hour or 2 can ease irritation. Try gargling with 1 of these solutions:    1/4 teaspoon of salt in 1/2 cup of warm water    An over-the-counter anesthetic gargle  Use medicine for more relief  Over-the-counter medicine can reduce sore throat symptoms. Ask your pharmacist if you have questions about which medicine to use:    Ease pain with anesthetic sprays. Aspirin or an aspirin substitute also helps. Remember, never give aspirin to anyone 18 or younger, or if you are already taking blood thinners.     For sore throats caused by allergies, try antihistamines to block the allergic reaction.    Remember: unless a sore throat is caused by a bacterial infection, antibiotics wont help you.  Prevent future sore throats  Prevention tips include the following:    Stop smoking or reduce contact with secondhand smoke. Smoke irritates the tender throat lining.    Limit contact with pets and with  allergy-causing substances, such as pollen and mold.    When youre around someone with a sore throat or cold, wash your hands often to keep viruses or bacteria from spreading.    Dont strain your vocal cords.  Call your healthcare provider  Contact your healthcare provider if you have:    A temperature over 101 F (38.3 C)    White spots on the throat    Great difficulty swallowing    Trouble breathing    A skin rash    Recent exposure to someone else with strep bacteria    Severe hoarseness and swollen glands in the neck or jaw   Date Last Reviewed: 8/1/2016 2000-2016 [employer] [address] [place] [address]. All rights reserved. This information is not intended as a substitute for professional medical care. Always follow your healthcare professional's instructions.           Clemetne Coronado,